# Patient Record
Sex: MALE | Race: WHITE | Employment: PART TIME | URBAN - METROPOLITAN AREA
[De-identification: names, ages, dates, MRNs, and addresses within clinical notes are randomized per-mention and may not be internally consistent; named-entity substitution may affect disease eponyms.]

---

## 2019-05-17 ENCOUNTER — HOSPITAL ENCOUNTER (EMERGENCY)
Facility: HOSPITAL | Age: 39
Discharge: HOME | End: 2019-05-17
Attending: EMERGENCY MEDICINE

## 2019-05-17 ENCOUNTER — APPOINTMENT (OUTPATIENT)
Dept: GENERAL RADIOLOGY | Facility: HOSPITAL | Age: 39
End: 2019-05-17
Attending: NURSE PRACTITIONER

## 2019-05-17 VITALS
DIASTOLIC BLOOD PRESSURE: 99 MMHG | BODY MASS INDEX: 28.04 KG/M2 | RESPIRATION RATE: 13 BRPM | HEIGHT: 68 IN | HEART RATE: 92 BPM | SYSTOLIC BLOOD PRESSURE: 140 MMHG | OXYGEN SATURATION: 97 % | WEIGHT: 185 LBS | TEMPERATURE: 100 F

## 2019-05-17 DIAGNOSIS — F13.230 BENZODIAZEPINE WITHDRAWAL WITHOUT COMPLICATION (HCC): ICD-10-CM

## 2019-05-17 DIAGNOSIS — I10 HYPERTENSION, UNSPECIFIED TYPE: Primary | ICD-10-CM

## 2019-05-17 PROCEDURE — 96361 HYDRATE IV INFUSION ADD-ON: CPT

## 2019-05-17 PROCEDURE — 80320 DRUG SCREEN QUANTALCOHOLS: CPT | Performed by: NURSE PRACTITIONER

## 2019-05-17 PROCEDURE — 99285 EMERGENCY DEPT VISIT HI MDM: CPT

## 2019-05-17 PROCEDURE — 83690 ASSAY OF LIPASE: CPT | Performed by: NURSE PRACTITIONER

## 2019-05-17 PROCEDURE — 84484 ASSAY OF TROPONIN QUANT: CPT | Performed by: NURSE PRACTITIONER

## 2019-05-17 PROCEDURE — 80053 COMPREHEN METABOLIC PANEL: CPT | Performed by: NURSE PRACTITIONER

## 2019-05-17 PROCEDURE — 96375 TX/PRO/DX INJ NEW DRUG ADDON: CPT

## 2019-05-17 PROCEDURE — 71045 X-RAY EXAM CHEST 1 VIEW: CPT | Performed by: NURSE PRACTITIONER

## 2019-05-17 PROCEDURE — 80329 ANALGESICS NON-OPIOID 1 OR 2: CPT | Performed by: NURSE PRACTITIONER

## 2019-05-17 PROCEDURE — 93010 ELECTROCARDIOGRAM REPORT: CPT

## 2019-05-17 PROCEDURE — 96374 THER/PROPH/DIAG INJ IV PUSH: CPT

## 2019-05-17 PROCEDURE — 85025 COMPLETE CBC W/AUTO DIFF WBC: CPT | Performed by: NURSE PRACTITIONER

## 2019-05-17 PROCEDURE — 80307 DRUG TEST PRSMV CHEM ANLYZR: CPT | Performed by: NURSE PRACTITIONER

## 2019-05-17 PROCEDURE — 93005 ELECTROCARDIOGRAM TRACING: CPT

## 2019-05-17 RX ORDER — LORAZEPAM 2 MG/ML
1 INJECTION INTRAMUSCULAR ONCE
Status: COMPLETED | OUTPATIENT
Start: 2019-05-17 | End: 2019-05-17

## 2019-05-17 RX ORDER — LABETALOL HYDROCHLORIDE 5 MG/ML
20 INJECTION, SOLUTION INTRAVENOUS ONCE
Status: COMPLETED | OUTPATIENT
Start: 2019-05-17 | End: 2019-05-17

## 2019-05-17 RX ORDER — METOPROLOL TARTRATE 50 MG/1
50 TABLET, FILM COATED ORAL
COMMUNITY

## 2019-05-17 RX ORDER — LORAZEPAM 1 MG/1
TABLET ORAL
Refills: 0 | COMMUNITY
Start: 2019-04-23

## 2019-05-17 RX ORDER — METOPROLOL TARTRATE 50 MG/1
50 TABLET, FILM COATED ORAL ONCE
Status: COMPLETED | OUTPATIENT
Start: 2019-05-17 | End: 2019-05-17

## 2019-05-17 RX ORDER — METOPROLOL TARTRATE 50 MG/1
50 TABLET, FILM COATED ORAL DAILY
Qty: 20 TABLET | Refills: 0 | Status: SHIPPED | OUTPATIENT
Start: 2019-05-17

## 2019-05-17 NOTE — CM/SW NOTE
Spoke with patient about resources that he has been provided by [facility]. For medical care, pt has utilized the [facility] clinic. Reviewed with him the importance of following with the A for standard medical care, as they can provide continuity of care.

## 2019-05-17 NOTE — ED NOTES
Pt last drink of alcohol was at 0600 today; pt states he was using alcohol to replace the alprazolam

## 2019-05-17 NOTE — ED INITIAL ASSESSMENT (HPI)
Pt [facility]. Pt states he last took Alprazolam 4 days ago. Pt feels very anxious and scared he will have a withdraw symptoms. Pt send here for evaluation from [facility].

## 2019-05-17 NOTE — ED NOTES
Pt is a 41year old male who was in the ED for Xanax abuse. Pt reports that he came to the hospital today because he is in need of Xanax because he is experiencing withdrawals since it has been 4 day since he last took his medicaiton.  Pt reports that he ha

## 2019-05-17 NOTE — ED PROVIDER NOTES
Patient Seen in: [facility] Emergency Department    History   Patient presents with:   Anxiety/Panic attack (neurologic)    Stated Complaint: anxiety    HPI  41-year-old male with history of reflux, anxiety, and hypertension who currently takes 6 mg 140/99   Pulse 92   Temp 99.5 °F (37.5 °C) (Temporal)   Resp 13   Ht 172.7 cm (5' 8\")   Wt 83.9 kg   SpO2 97%   BMI 28.13 kg/m²         Physical Exam   Constitutional: He is oriented to person, place, and time.  He appears well-developed and well-nourished the following components:    Benzodiazepines Urine Presumed Positive (*)     Cannabinoid Urine Presumed Positive (*)     All other components within normal limits    Narrative:     Results of the Urine Drug Screen should be used only for medical purposes. Dictated by: Emerita Vernon MD on 5/17/2019 at 15:03     Approved by: Emerita Vernon MD                MDM       X-ray negative. Patient with hypertension and has not been taking his medications.   Patient was given Ativan IV to prevent any seizure

## 2019-05-17 NOTE — ED PROVIDER NOTES
I reviewed that chart and discussed the case. I have examined the patient and noted extending history of substance abuse. He is currently run out of his Xanax. He states he is followed by Dr. Vik Blanco who is a psychiatrist.  He has not seen him recently.

## (undated) NOTE — ED AVS SNAPSHOT
Heidi Wood   MRN: [id number]    Department:  [facility] Emergency Department   Date of Visit:  5/17/2019           Disclosure     Insurance plans vary and the physician(s) referred by the ER may not be covered by your plan.  Please contact yo tell this physician (or your personal doctor if your instructions are to return to your personal doctor) about any new or lasting problems. The primary care or specialist physician will see patients referred from the [facility] Emergency Department.  Leticia Jane